# Patient Record
Sex: MALE | HISPANIC OR LATINO | Employment: FULL TIME | URBAN - METROPOLITAN AREA
[De-identification: names, ages, dates, MRNs, and addresses within clinical notes are randomized per-mention and may not be internally consistent; named-entity substitution may affect disease eponyms.]

---

## 2018-08-29 ENCOUNTER — HOSPITAL ENCOUNTER (EMERGENCY)
Facility: HOSPITAL | Age: 18
Discharge: HOME | End: 2018-08-29
Attending: EMERGENCY MEDICINE
Payer: COMMERCIAL

## 2018-08-29 VITALS
SYSTOLIC BLOOD PRESSURE: 142 MMHG | OXYGEN SATURATION: 99 % | HEIGHT: 68 IN | WEIGHT: 251.99 LBS | HEART RATE: 81 BPM | DIASTOLIC BLOOD PRESSURE: 76 MMHG | BODY MASS INDEX: 38.19 KG/M2 | RESPIRATION RATE: 18 BRPM | TEMPERATURE: 97.8 F

## 2018-08-29 DIAGNOSIS — K60.2 ANAL FISSURE: Primary | ICD-10-CM

## 2018-08-29 PROCEDURE — 99284 EMERGENCY DEPT VISIT MOD MDM: CPT

## 2018-08-29 PROCEDURE — 82272 OCCULT BLD FECES 1-3 TESTS: CPT

## 2018-08-29 RX ORDER — DICYCLOMINE HCL 20 MG
20 TABLET ORAL 2 TIMES DAILY PRN
Qty: 20 TABLET | Refills: 0 | Status: SHIPPED | OUTPATIENT
Start: 2018-08-29 | End: 2019-08-08

## 2018-08-29 NOTE — DISCHARGE INSTRUCTIONS
Anal Fissure   WHAT YOU NEED TO KNOW:   An anal fissure is a cut or tear in the tissue inside your anus  An anal fissure may be acute or chronic  An acute anal fissure is usually small and shallow and often heals without treatment  A chronic fissure may last longer than a month and will usually require treatment  A chronic anal fissure comes back after treatment  DISCHARGE INSTRUCTIONS:   Medicine:   · Topical medicine: Topical medicine may be put just inside your anus  This medicine may help your anal muscle relax and increase blood flow to your anus  This medicine may contain anesthesia to help decrease your pain  Your healthcare provider will teach you the right way to use topical medicine  · Stool softeners: Your healthcare provider may also give you medicine that makes your bowel movements softer  This helps prevent constipation  You will be less likely to strain and cause an anal fissure if you are not constipated  · Take your medicine as directed  Contact your healthcare provider if you think your medicine is not helping or if you have side effects  Tell him of her if you are allergic to any medicine  Keep a list of the medicines, vitamins, and herbs you take  Include the amounts, and when and why you take them  Bring the list or the pill bottles to follow-up visits  Carry your medicine list with you in case of an emergency  Follow up with your healthcare provider as directed: Your healthcare provider will need to make sure your anal fissure heals  Write down your questions so you remember to ask them during your visits  Bathing: You may need to soak in a warm tub or take a sitz bath  This may help decrease pain and swelling  You may need to do this more than once a day  Ask your healthcare provider how to use a sitz bath and how often you should bathe  Bowel care:  Do not ignore the urge to have a bowel movement  Do not strain  Clean the area gently after every bowel movement     Nutrition:  Eat foods that are high in fiber to help keep your bowel movements soft  High-fiber foods include fruits, vegetables, and whole grains  Drink more liquids to help soften your bowel movements  This will help prevent you from straining  Ask your healthcare provider how much liquid you should drink each day  Sexual intercourse:  Avoid anal intercourse for as long as directed by your healthcare provider  Anal intercourse may make it harder for your anal fissure to heal  It may also tear more tissue around your anus  Contact your healthcare provider if:   · You have a fever  · You still have pain after taking pain medicine  · You are unable to have a bowel movement  · You have spasms in your anus that do not stop  · You have questions or concerns about your condition or care  Return to the emergency department if:   · You have very bad pain in or around your anus  · You have bleeding from your anus that does not stop  © 2017 [address] [employer] Information is for End User's use only and may not be sold, redistributed or otherwise used for commercial purposes  All illustrations and images included in CareNotes® are the copyrighted property of A D A M , Inc  or Dexter Harvey  The above information is an  only  It is not intended as medical advice for individual conditions or treatments  Talk to your doctor, nurse or pharmacist before following any medical regimen to see if it is safe and effective for you

## 2018-08-29 NOTE — ED PROVIDER NOTES
History  Chief Complaint   Patient presents with    Abdominal Pain     denies nausea, vomiting   Rectal Bleeding     Patient is a 78-year-old male who complains of an episode of bright red blood per rectum 3 days ago and crampy lower abdominal pain x1 day  Patient states had 1 episode of bright red blood per rectum 3 days ago on has had subsequent bowel movements with no issues  States that he does have intermittent straining with his bowel movements but denies any direct history of constipation  Denies any nausea or vomiting or diarrhea  States his last bowel movement was at noon today with no issues  Did not eat anything today  States he has had normal appetite over last couple days  Did not get the medication for his pain  Nor has he called his regular docto     Patient states he edema no his regular doctor is  Denies any fevers sweats or chills  No history of abdominal surgery  None       History reviewed  No pertinent past medical history  History reviewed  No pertinent surgical history  History reviewed  No pertinent family history  I have reviewed and agree with the history as documented  Social History   Substance Use Topics    Smoking status: Never Smoker    Smokeless tobacco: Not on file    Alcohol use No        Review of Systems   Constitutional: Negative  HENT: Negative  Eyes: Negative  Respiratory: Negative  Cardiovascular: Negative  Gastrointestinal: Positive for abdominal pain and blood in stool  Negative for constipation, diarrhea, nausea, rectal pain and vomiting  Endocrine: Negative  Genitourinary: Negative  Musculoskeletal: Negative  Skin: Negative  Allergic/Immunologic: Negative  Neurological: Negative  Hematological: Negative  Psychiatric/Behavioral: Negative  All other systems reviewed and are negative  Physical Exam  Physical Exam   Constitutional: He appears well-developed and well-nourished     HENT:   Head: Normocephalic  Nose: Nose normal    Mouth/Throat: Oropharynx is clear and moist    Eyes: Conjunctivae and EOM are normal  Pupils are equal, round, and reactive to light  Neck: Normal range of motion  Neck supple  Cardiovascular: Normal rate, regular rhythm, normal heart sounds and intact distal pulses  Pulmonary/Chest: Effort normal and breath sounds normal    Abdominal: Soft  Bowel sounds are normal  He exhibits no distension and no mass  There is no tenderness  There is no rebound and no guarding  Genitourinary: Prostate normal  Rectal exam shows fissure  Rectal exam shows no external hemorrhoid, no internal hemorrhoid, no mass, no tenderness, anal tone normal and guaiac negative stool  Genitourinary Comments: Small pinhole lesion at 7 oclock  Musculoskeletal: Normal range of motion  Neurological: He is alert  Skin: Skin is warm and dry  Capillary refill takes less than 2 seconds  Nursing note and vitals reviewed  Vital Signs  ED Triage Vitals [08/29/18 1559]   Temperature Pulse Respirations Blood Pressure SpO2   97 8 °F (36 6 °C) 81 18 142/76 99 %      Temp Source Heart Rate Source Patient Position - Orthostatic VS BP Location FiO2 (%)   Temporal Monitor Sitting Left arm --      Pain Score       --           Vitals:    08/29/18 1559   BP: 142/76   Pulse: 81   Patient Position - Orthostatic VS: Sitting       Visual Acuity      ED Medications  Medications - No data to display    Diagnostic Studies  Results Reviewed     None                 No orders to display              Procedures  Procedures       Phone Contacts  ED Phone Contact    ED Course                               MDM  Number of Diagnoses or Management Options  Diagnosis management comments: Patient is a 70-year-old male who had an episode of bright red blood per eating 3 days ago  Exam shows a small anal fissure  Negative guaiac on exam   Normal color stool  No elle melena    Patient hemodynamically stable no signs anemia  Abdomen soft nontender nondistended  At this point will discharge some Bentyl for any crampy pain follow up his regular doctor return to the ER for any concerns  Amount and/or Complexity of Data Reviewed  Clinical lab tests: ordered and reviewed      CritCare Time    Disposition  Final diagnoses:   Anal fissure     Time reflects when diagnosis was documented in both MDM as applicable and the Disposition within this note     Time User Action Codes Description Comment    8/29/2018  4:13 PM Winston Angeles Add [K60 2] Anal fissure       ED Disposition     ED Disposition Condition Comment    Discharge  Adryan Crystal discharge to home/self care  Condition at discharge: Stable        Follow-up Information     Follow up With Specialties Details Why Contact Info    Fariha MORENO  [address]  [phone]            Discharge Medication List as of 8/29/2018  4:14 PM      START taking these medications    Details   dicyclomine (BENTYL) 20 mg tablet Take 1 tablet (20 mg total) by mouth 2 (two) times a day as needed (abdominal pain ), Starting Wed 8/29/2018, Print           No discharge procedures on file      ED Provider  Electronically Signed by           King Addison MD  08/30/18 7102

## 2019-08-08 ENCOUNTER — HOSPITAL ENCOUNTER (EMERGENCY)
Facility: HOSPITAL | Age: 19
Discharge: HOME | End: 2019-08-08
Attending: EMERGENCY MEDICINE | Admitting: EMERGENCY MEDICINE

## 2019-08-08 VITALS
TEMPERATURE: 97.2 F | DIASTOLIC BLOOD PRESSURE: 72 MMHG | WEIGHT: 254.19 LBS | SYSTOLIC BLOOD PRESSURE: 139 MMHG | OXYGEN SATURATION: 97 % | RESPIRATION RATE: 17 BRPM | BODY MASS INDEX: 38.65 KG/M2 | HEART RATE: 96 BPM

## 2019-08-08 DIAGNOSIS — R19.7 DIARRHEA: Primary | ICD-10-CM

## 2019-08-08 LAB
ALBUMIN SERPL BCP-MCNC: 4.4 G/DL (ref 3–5.2)
ALP SERPL-CCNC: 96 U/L (ref 43–122)
ALT SERPL W P-5'-P-CCNC: 7 U/L (ref 9–52)
ANION GAP SERPL CALCULATED.3IONS-SCNC: 11 MMOL/L (ref 5–14)
AST SERPL W P-5'-P-CCNC: 21 U/L (ref 17–59)
BACTERIA UR QL AUTO: ABNORMAL /HPF
BASOPHILS # BLD AUTO: 0 THOUSANDS/ΜL (ref 0–0.1)
BASOPHILS NFR BLD AUTO: 0 % (ref 0–1)
BILIRUB SERPL-MCNC: 0.6 MG/DL
BILIRUB UR QL STRIP: ABNORMAL
BUN SERPL-MCNC: 9 MG/DL (ref 5–25)
CALCIUM SERPL-MCNC: 9.4 MG/DL (ref 8.4–10.2)
CHLORIDE SERPL-SCNC: 99 MMOL/L (ref 97–108)
CLARITY UR: ABNORMAL
CO2 SERPL-SCNC: 29 MMOL/L (ref 22–30)
COLOR UR: ABNORMAL
CREAT SERPL-MCNC: 0.85 MG/DL (ref 0.7–1.5)
EOSINOPHIL # BLD AUTO: 0 THOUSAND/ΜL (ref 0–0.4)
EOSINOPHIL NFR BLD AUTO: 0 % (ref 0–6)
ERYTHROCYTE [DISTWIDTH] IN BLOOD BY AUTOMATED COUNT: 13.7 %
GFR SERPL CREATININE-BSD FRML MDRD: 125 ML/MIN/1.73SQ M
GLUCOSE SERPL-MCNC: 104 MG/DL (ref 70–99)
GLUCOSE UR STRIP-MCNC: NEGATIVE MG/DL
HCT VFR BLD AUTO: 47.2 % (ref 41–53)
HGB BLD-MCNC: 15.8 G/DL (ref 13.5–17.5)
HGB UR QL STRIP.AUTO: 10
KETONES UR STRIP-MCNC: ABNORMAL MG/DL
LEUKOCYTE ESTERASE UR QL STRIP: NEGATIVE
LIPASE SERPL-CCNC: 106 U/L (ref 23–300)
LYMPHOCYTES # BLD AUTO: 1.1 THOUSANDS/ΜL (ref 0.5–4)
LYMPHOCYTES NFR BLD AUTO: 9 % (ref 25–45)
MCH RBC QN AUTO: 28.2 PG (ref 26–34)
MCHC RBC AUTO-ENTMCNC: 33.5 G/DL (ref 31–36)
MCV RBC AUTO: 84 FL (ref 80–100)
MONOCYTES # BLD AUTO: 0.6 THOUSAND/ΜL (ref 0.2–0.9)
MONOCYTES NFR BLD AUTO: 6 % (ref 1–10)
MUCOUS THREADS UR QL AUTO: ABNORMAL
NEUTROPHILS # BLD AUTO: 9.8 THOUSANDS/ΜL (ref 1.8–7.8)
NEUTS SEG NFR BLD AUTO: 85 % (ref 45–65)
NITRITE UR QL STRIP: NEGATIVE
NON-SQ EPI CELLS URNS QL MICRO: ABNORMAL /HPF
PH UR STRIP.AUTO: 6 [PH]
PLATELET # BLD AUTO: 225 THOUSANDS/UL (ref 150–450)
PMV BLD AUTO: 7.1 FL (ref 8.9–12.7)
POTASSIUM SERPL-SCNC: 3.2 MMOL/L (ref 3.6–5)
PROT SERPL-MCNC: 8 G/DL (ref 5.9–8.4)
PROT UR STRIP-MCNC: ABNORMAL MG/DL
RBC # BLD AUTO: 5.61 MILLION/UL (ref 4.5–5.9)
RBC #/AREA URNS AUTO: ABNORMAL /HPF
SODIUM SERPL-SCNC: 139 MMOL/L (ref 137–147)
SP GR UR STRIP.AUTO: 1.02 (ref 1–1.04)
UROBILINOGEN UA: NEGATIVE MG/DL
WBC # BLD AUTO: 11.5 THOUSAND/UL (ref 4.5–11)
WBC #/AREA URNS AUTO: ABNORMAL /HPF

## 2019-08-08 PROCEDURE — 81001 URINALYSIS AUTO W/SCOPE: CPT | Performed by: EMERGENCY MEDICINE

## 2019-08-08 PROCEDURE — 99283 EMERGENCY DEPT VISIT LOW MDM: CPT | Performed by: EMERGENCY MEDICINE

## 2019-08-08 PROCEDURE — 83690 ASSAY OF LIPASE: CPT | Performed by: EMERGENCY MEDICINE

## 2019-08-08 PROCEDURE — 80053 COMPREHEN METABOLIC PANEL: CPT | Performed by: EMERGENCY MEDICINE

## 2019-08-08 PROCEDURE — 96360 HYDRATION IV INFUSION INIT: CPT

## 2019-08-08 PROCEDURE — NC001 PR NO CHARGE: Performed by: EMERGENCY MEDICINE

## 2019-08-08 PROCEDURE — 85025 COMPLETE CBC W/AUTO DIFF WBC: CPT | Performed by: EMERGENCY MEDICINE

## 2019-08-08 PROCEDURE — 99284 EMERGENCY DEPT VISIT MOD MDM: CPT

## 2019-08-08 PROCEDURE — 36415 COLL VENOUS BLD VENIPUNCTURE: CPT | Performed by: EMERGENCY MEDICINE

## 2019-08-08 RX ORDER — POTASSIUM CHLORIDE 750 MG/1
40 TABLET, EXTENDED RELEASE ORAL ONCE
Status: COMPLETED | OUTPATIENT
Start: 2019-08-08 | End: 2019-08-08

## 2019-08-08 RX ORDER — DICYCLOMINE HCL 20 MG
20 TABLET ORAL 2 TIMES DAILY
Qty: 6 TABLET | Refills: 0 | Status: SHIPPED | OUTPATIENT
Start: 2019-08-08 | End: 2019-08-11

## 2019-08-08 RX ORDER — DICYCLOMINE HCL 20 MG
20 TABLET ORAL ONCE
Status: COMPLETED | OUTPATIENT
Start: 2019-08-08 | End: 2019-08-08

## 2019-08-08 RX ADMIN — POTASSIUM CHLORIDE 40 MEQ: 10 TABLET, EXTENDED RELEASE ORAL at 12:51

## 2019-08-08 RX ADMIN — DICYCLOMINE HYDROCHLORIDE 20 MG: 20 TABLET ORAL at 12:51

## 2019-08-08 RX ADMIN — SODIUM CHLORIDE 1000 ML: 0.9 INJECTION, SOLUTION INTRAVENOUS at 11:52

## 2019-08-08 NOTE — ED PROVIDER NOTES
History  Chief Complaint   Patient presents with    Abdominal Pain     abd pain since yesterday  also having diarrhea off and on for about 1 month     22year old male with no significant PMH has diarrhea and abdominal pain x 2 days  Returned from [place] 3 days ago after 5 months there  Does not recall eating anything unusual  No recent alcohol use or drug use  Reports drinking two large monsters 2 days ago  Had 6-7 loose watery stools over past 48 hours  No blood in stool  Unsure if he had a fever but reports being extra cold yesterday, and headache x 1 day  Currently patient denies any chest pain, palpitations, light headedness, vision change, N/V, urinary symptoms  Patient denies any falls or bodily injuries or head injury  Recalls about three months ago in  he had a couple episodes of diarrhea and notes he saw something like a little worm or bug in his stool  Did not see any movement  The diarrhea subsided and he never noted anything similar since then  None       History reviewed  No pertinent past medical history  Past Surgical History:   Procedure Laterality Date    EYE SURGERY         History reviewed  No pertinent family history  I have reviewed and agree with the history as documented  Social History     Tobacco Use    Smoking status: Never Smoker    Smokeless tobacco: Never Used   Substance Use Topics    Alcohol use: Yes     Comment: rare    Drug use: No        Review of Systems   Constitutional: Positive for chills  Negative for activity change, appetite change, diaphoresis and fatigue  HENT: Negative  Negative for congestion, rhinorrhea, sinus pressure, sinus pain, sore throat and trouble swallowing  Eyes: Negative  Negative for pain, discharge, itching and visual disturbance  Respiratory: Negative  Negative for apnea, cough, chest tightness and shortness of breath  Cardiovascular: Negative    Negative for chest pain, palpitations and leg swelling  Gastrointestinal: Positive for abdominal pain and diarrhea  Negative for abdominal distention, nausea and vomiting  Endocrine: Negative  Genitourinary: Negative  Negative for difficulty urinating and dysuria  Musculoskeletal: Negative  Negative for arthralgias, myalgias, neck pain and neck stiffness  Skin: Negative  Negative for rash  Allergic/Immunologic: Negative  Neurological: Positive for headaches  Negative for dizziness, weakness, light-headedness and numbness  Hematological: Negative  Psychiatric/Behavioral: Negative  Negative for agitation and behavioral problems  Physical Exam  ED Triage Vitals [08/08/19 1115]   Temperature Pulse Respirations Blood Pressure SpO2   (!) 97 2 °F (36 2 °C) 103 20 157/91 99 %      Temp Source Heart Rate Source Patient Position - Orthostatic VS BP Location FiO2 (%)   Tympanic Monitor Sitting Left arm --      Pain Score       7             Orthostatic Vital Signs  Vitals:    08/08/19 1115   BP: 157/91   Pulse: 103   Patient Position - Orthostatic VS: Sitting       Physical Exam   Constitutional: He is oriented to person, place, and time  He appears well-developed and well-nourished  HENT:   Head: Normocephalic and atraumatic  Mouth/Throat: No oropharyngeal exudate  Eyes: Pupils are equal, round, and reactive to light  EOM are normal    Neck: Normal range of motion  Neck supple  Cardiovascular: Normal rate, regular rhythm, normal heart sounds and intact distal pulses  No murmur heard  Pulmonary/Chest: Effort normal and breath sounds normal  No respiratory distress  He exhibits no tenderness  Abdominal: Soft  Bowel sounds are normal  He exhibits no distension  There is tenderness in the epigastric area and suprapubic area  There is no rebound, no guarding, no tenderness at McBurney's point and negative Banegas's sign  Musculoskeletal: Normal range of motion  Neurological: He is alert and oriented to person, place, and time  Skin: Skin is warm and dry  Capillary refill takes less than 2 seconds  He is not diaphoretic  Psychiatric: He has a normal mood and affect  Nursing note and vitals reviewed  ED Medications  Medications   sodium chloride 0 9 % bolus 1,000 mL (1,000 mL Intravenous New Bag 8/8/19 1152)       Diagnostic Studies  Results Reviewed     Procedure Component Value Units Date/Time    Comprehensive metabolic panel [[id number]] Collected:  08/08/19 1150    Lab Status: In process Specimen:  Blood from Arm, Left Updated:  08/08/19 1207    Lipase [[id number]] Collected:  08/08/19 1150    Lab Status: In process Specimen:  Blood from Arm, Left Updated:  08/08/19 1207    CBC and differential [[id number]] Collected:  08/08/19 1150    Lab Status: In process Specimen:  Blood from Arm, Left Updated:  08/08/19 1207    UA w Reflex to Microscopic [[id number]] Collected:  08/08/19 1150    Lab Status: In process Specimen:  Urine, Clean Catch Updated:  08/08/19 1206    Ova and parasite examination [[id number]]     Lab Status:  No result Specimen:  Stool from Rectum     Clostridium difficile toxin by PCR [[id number]]     Lab Status:  No result Specimen:  Stool                  No orders to display         Procedures  Procedures        ED Course                               MDM  Number of Diagnoses or Management Options  Diarrhea:   Diagnosis management comments: 22year old male with no significant PMH has diarrhea and abdominal pain x 2 days  Possible gastroenteritis, viral or ova/parasite given his recent travel  Physical exam shows mild pain on tenderness in epigastric and suprapubic regions  UA positive ketones, likely dehydrated, IV fluids commenced  Low potassium and was give KCl PO, encouraged to eat more bananas  Was unable to provide a stool sample today and provided equipment to collect it at home and bring [facility] to [facility] lab  Reports abdominal pain improved mildly with Bentyl   Patient hemodynamically stable and instructed to follow up with PCP within one week to establish care  If symptoms worsen he will return to ED  Disposition  Final diagnoses:   None     ED Disposition     None      Follow-up Information    None         Patient's Medications   Discharge Prescriptions    No medications on file     No discharge procedures on file  ED Provider  Attending physically available and evaluated Clephani Juve  I managed the patient along with the ED Attending      Electronically Signed by         Michael Saenz MD  08/08/19 6396

## 2019-08-08 NOTE — ED PROVIDER NOTES
History  Chief Complaint   Patient presents with    Abdominal Pain     abd pain since yesterday  also having diarrhea off and on for about 1 month       History provided by:  Patient  Abdominal Pain   Pain location:  Suprapubic and epigastric  Pain quality: aching and cramping    Pain radiates to:  Does not radiate  Pain severity:  Moderate  Onset quality:  Gradual  Timing:  Constant  Progression:  Worsening  Chronicity:  New  Relieved by:  Nothing  Worsened by:  Nothing  Ineffective treatments:  None tried  Associated symptoms: diarrhea    Associated symptoms: no chest pain, no chills, no cough, no dysuria, no fever, no nausea, no shortness of breath and no sore throat    Diarrhea:     Number of occurrences:  6-7    Severity:  Moderate    Timing:  Constant    Progression:  Worsening      None       History reviewed  No pertinent past medical history  Past Surgical History:   Procedure Laterality Date    EYE SURGERY         History reviewed  No pertinent family history  I have reviewed and agree with the history as documented  Social History     Tobacco Use    Smoking status: Never Smoker    Smokeless tobacco: Never Used   Substance Use Topics    Alcohol use: Yes     Comment: rare    Drug use: No        Review of Systems   Constitutional: Negative for chills and fever  HENT: Negative for rhinorrhea, sore throat and trouble swallowing  Eyes: Negative for pain  Respiratory: Negative for cough, shortness of breath, wheezing and stridor  Cardiovascular: Negative for chest pain and leg swelling  Gastrointestinal: Positive for abdominal pain and diarrhea  Negative for nausea  Endocrine: Negative for polyuria  Genitourinary: Negative for dysuria, flank pain and urgency  Musculoskeletal: Negative for joint swelling, myalgias and neck stiffness  Skin: Negative for rash  Allergic/Immunologic: Negative for immunocompromised state     Neurological: Negative for dizziness, syncope, weakness, numbness and headaches  Psychiatric/Behavioral: Negative for confusion and suicidal ideas  All other systems reviewed and are negative  Physical Exam  Physical Exam   Constitutional: He is oriented to person, place, and time  He appears well-developed and well-nourished  HENT:   Head: Normocephalic and atraumatic  Eyes: Pupils are equal, round, and reactive to light  EOM are normal    Neck: Normal range of motion  Neck supple  Cardiovascular: Normal rate and regular rhythm  Exam reveals no friction rub  No murmur heard  Pulmonary/Chest: Breath sounds normal  No respiratory distress  He has no wheezes  He has no rales  Abdominal: Soft  Bowel sounds are normal  He exhibits no distension  There is tenderness in the epigastric area and suprapubic area  Musculoskeletal: Normal range of motion  He exhibits no edema or tenderness  Neurological: He is alert and oriented to person, place, and time  Skin: Skin is warm  No rash noted  Psychiatric: He has a normal mood and affect  Nursing note and vitals reviewed        Vital Signs  ED Triage Vitals [08/08/19 1115]   Temperature Pulse Respirations Blood Pressure SpO2   (!) 97 2 °F (36 2 °C) 103 20 157/91 99 %      Temp Source Heart Rate Source Patient Position - Orthostatic VS BP Location FiO2 (%)   Tympanic Monitor Sitting Left arm --      Pain Score       7           Vitals:    08/08/19 1115 08/08/19 1253   BP: 157/91 139/72   Pulse: 103 96   Patient Position - Orthostatic VS: Sitting Lying         Visual Acuity      ED Medications  Medications   sodium chloride 0 9 % bolus 1,000 mL (0 mL Intravenous Stopped 8/8/19 1253)   dicyclomine (BENTYL) tablet 20 mg (20 mg Oral Given 8/8/19 1251)   potassium chloride (K-DUR,KLOR-CON) CR tablet 40 mEq (40 mEq Oral Given 8/8/19 1251)       Diagnostic Studies  Results Reviewed     Procedure Component Value Units Date/Time    Urine Microscopic [[id number]]  (Abnormal) Collected:  08/08/19 1150    Lab Status: Final result Specimen:  Urine, Clean Catch Updated:  08/08/19 1220     RBC, UA 0-1 /hpf      WBC, UA 1-2 /hpf      Epithelial Cells Occasional /hpf      Bacteria, UA Occasional /hpf      MUCUS THREADS Moderate    Comprehensive metabolic panel [[id number]]  (Abnormal) Collected:  08/08/19 1150    Lab Status:  Final result Specimen:  Blood from Arm, Left Updated:  08/08/19 1217     Sodium 139 mmol/L      Potassium 3 2 mmol/L      Chloride 99 mmol/L      CO2 29 mmol/L      ANION GAP 11 mmol/L      BUN 9 mg/dL      Creatinine 0 85 mg/dL      Glucose 104 mg/dL      Calcium 9 4 mg/dL      AST 21 U/L      ALT 7 U/L      Alkaline Phosphatase 96 U/L      Total Protein 8 0 g/dL      Albumin 4 4 g/dL      Total Bilirubin 0 60 mg/dL      eGFR 125 ml/min/1 73sq m     Narrative:       [place] guidelines for Chronic Kidney Disease (CKD):     Stage 1 with normal or high GFR (GFR > 90 mL/min/1 73 square meters)    Stage 2 Mild CKD (GFR = 60-89 mL/min/1 73 square meters)    Stage 3A Moderate CKD (GFR = 45-59 mL/min/1 73 square meters)    Stage 3B Moderate CKD (GFR = 30-44 mL/min/1 73 square meters)    Stage 4 Severe CKD (GFR = 15-29 mL/min/1 73 square meters)    Stage 5 End Stage CKD (GFR <15 mL/min/1 73 square meters)  Note: GFR calculation is accurate only with a steady state creatinine    Lipase [[id number]]  (Normal) Collected:  08/08/19 1150    Lab Status:  Final result Specimen:  Blood from Arm, Left Updated:  08/08/19 1217     Lipase 106 u/L     UA w Reflex to Microscopic [[id number]]  (Abnormal) Collected:  08/08/19 1150    Lab Status:  Final result Specimen:  Urine, Clean Catch Updated:  08/08/19 1212     Color, UA Brown     Clarity, UA Slightly Cloudy     Specific Counce, UA 1 020     pH, UA 6 0     Leukocytes, UA Negative     Nitrite, UA Negative     Protein, UA 30 (1+) mg/dl      Glucose, UA Negative mg/dl      Ketones, UA >=150 (3+) mg/dl      Bilirubin, UA 1 mg/dL     Blood, UA 10 0 UROBILINOGEN UA Negative mg/dL     CBC and differential [[id number]]  (Abnormal) Collected:  08/08/19 1150    Lab Status:  Final result Specimen:  Blood from Arm, Left Updated:  08/08/19 1211     WBC 11 50 Thousand/uL      RBC 5 61 Million/uL      Hemoglobin 15 8 g/dL      Hematocrit 47 2 %      MCV 84 fL      MCH 28 2 pg      MCHC 33 5 g/dL      RDW 13 7 %      MPV 7 1 fL      Platelets 815 Thousands/uL      Neutrophils Relative 85 %      Lymphocytes Relative 9 %      Monocytes Relative 6 %      Eosinophils Relative 0 %      Basophils Relative 0 %      Neutrophils Absolute 9 80 Thousands/µL      Lymphocytes Absolute 1 10 Thousands/µL      Monocytes Absolute 0 60 Thousand/µL      Eosinophils Absolute 0 00 Thousand/µL      Basophils Absolute 0 00 Thousands/µL     Ova and parasite examination [[id number]]     Lab Status:  No result Specimen:  Stool from Rectum     Clostridium difficile toxin by PCR [[id number]]     Lab Status:  No result Specimen:  Stool                  No orders to display              Procedures  Procedures       ED Course                               MDM  Number of Diagnoses or Management Options     Amount and/or Complexity of Data Reviewed  Clinical lab tests: ordered and reviewed        Disposition  Final diagnoses:   Diarrhea     Time reflects when diagnosis was documented in both MDM as applicable and the Disposition within this note     Time User Action Codes Description Comment    8/8/2019 12:32 PM Sia Newsome Add [R19 7] Diarrhea       ED Disposition     ED Disposition Condition Date/Time Comment    Discharge Stable Thu Aug 8, 2019  1:02 PM Adryan Rojas discharge to home/self care              Follow-up Information     Follow up With Specialties Details Why Contact Info Additional [address] Medicine Schedule an appointment as soon as possible for a visit in 1 week with Dr Familia Saleh or any available provider [address], [address] [place] [place], [address], [address] [place], [place], [address]          Discharge Medication List as of 8/8/2019  1:04 PM      START taking these medications    Details   dicyclomine (BENTYL) 20 mg tablet Take 1 tablet (20 mg total) by mouth 2 (two) times a day for 3 days, Starting Thu 8/8/2019, Until Sun 8/11/2019, Print           Outpatient Discharge Orders   Ova and parasite examination   Standing Status: Future Standing Exp  Date: 08/08/20     Clostridium difficile toxin by PCR   Standing Status: Future Standing Exp   Date: 08/08/20       ED Provider  Electronically Signed by           Sho Desouza DO  08/10/19 8310

## 2019-08-08 NOTE — DISCHARGE INSTRUCTIONS
Drink plenty of fluids   Add bananas into your diet for your potassium  Take Bentyl twice a day for three days for your abdominal pain   Make appointment with Dr Bao Alves, or any other provider at office listed above to establish care and follow up stool sample  Please provide a stool sample to the lab located to left when you walk into the main entrance of the hospital

## 2019-10-05 ENCOUNTER — OFFICE VISIT (OUTPATIENT)
Dept: URGENT CARE | Facility: MEDICAL CENTER | Age: 19
End: 2019-10-05
Payer: COMMERCIAL

## 2019-10-05 VITALS
DIASTOLIC BLOOD PRESSURE: 70 MMHG | SYSTOLIC BLOOD PRESSURE: 124 MMHG | TEMPERATURE: 97.8 F | RESPIRATION RATE: 16 BRPM | OXYGEN SATURATION: 99 % | HEART RATE: 78 BPM

## 2019-10-05 DIAGNOSIS — Z02.4 DRIVER'S PERMIT PE (PHYSICAL EXAMINATION): Primary | ICD-10-CM

## 2019-10-05 NOTE — PROGRESS NOTES
[address]        NAME: Leopold Melody is a 22 y o  male  : 2000    MRN: [id number]  DATE: 2019  TIME: 1:54 PM    Assessment and Plan   's permit PE (physical examination) [Z02 4]  1  's permit PE (physical examination)           Patient Instructions   Discussed need for glasses in the future  Chief Complaint     Chief Complaint   Patient presents with    Annual Exam     here for drivers permit physical         History of Present Illness       Patient here for 's exam   Negative history  See attached  Review of Systems   Review of Systems   All other systems reviewed and are negative  Current Medications       Current Outpatient Medications:     dicyclomine (BENTYL) 20 mg tablet, Take 1 tablet (20 mg total) by mouth 2 (two) times a day for 3 days, Disp: 6 tablet, Rfl: 0    Current Allergies     Allergies as of 10/05/2019 - Reviewed 10/05/2019   Allergen Reaction Noted    Milk-related compounds  2019            The following portions of the patient's history were reviewed and updated as appropriate: allergies, current medications, past family history, past medical history, past social history, past surgical history and problem list      History reviewed  No pertinent past medical history  Past Surgical History:   Procedure Laterality Date    EYE SURGERY         No family history on file  Medications have been verified  Objective   /70 (BP Location: Right arm, Patient Position: Sitting, Cuff Size: Standard)   Pulse 78   Temp 97 8 °F (36 6 °C) (Tympanic)   Resp 16   SpO2 99%        Physical Exam     Physical Exam   Constitutional: He is oriented to person, place, and time  He appears well-developed and well-nourished  HENT:   Head: Normocephalic     Right Ear: External ear normal    Left Ear: External ear normal    Nose: Nose normal    Mouth/Throat: Oropharynx is clear and moist    Eyes: Pupils are equal, round, and reactive to light  Conjunctivae and EOM are normal    Neck: Normal range of motion  Cardiovascular: Normal rate, regular rhythm and normal heart sounds  Pulmonary/Chest: Effort normal and breath sounds normal    Abdominal: Soft  Bowel sounds are normal    Musculoskeletal: Normal range of motion  Neurological: He is alert and oriented to person, place, and time  Nursing note and vitals reviewed

## 2021-01-05 ENCOUNTER — HOSPITAL ENCOUNTER (EMERGENCY)
Facility: HOSPITAL | Age: 21
Discharge: HOME | End: 2021-01-05
Attending: EMERGENCY MEDICINE | Admitting: EMERGENCY MEDICINE

## 2021-01-05 VITALS
HEIGHT: 68 IN | SYSTOLIC BLOOD PRESSURE: 146 MMHG | RESPIRATION RATE: 18 BRPM | TEMPERATURE: 99 F | OXYGEN SATURATION: 100 % | DIASTOLIC BLOOD PRESSURE: 70 MMHG | HEART RATE: 102 BPM | BODY MASS INDEX: 47.74 KG/M2 | WEIGHT: 315 LBS

## 2021-01-05 DIAGNOSIS — T78.1XXA ALLERGIC REACTION TO FOOD, INITIAL ENCOUNTER: Primary | ICD-10-CM

## 2021-01-05 PROCEDURE — 99282 EMERGENCY DEPT VISIT SF MDM: CPT | Performed by: PHYSICIAN ASSISTANT

## 2021-01-05 PROCEDURE — 99283 EMERGENCY DEPT VISIT LOW MDM: CPT

## 2021-01-05 RX ORDER — DIPHENHYDRAMINE HCL 25 MG
25 TABLET ORAL ONCE
Status: COMPLETED | OUTPATIENT
Start: 2021-01-05 | End: 2021-01-05

## 2021-01-05 RX ORDER — DIPHENHYDRAMINE HCL 25 MG
25 TABLET ORAL EVERY 6 HOURS
Qty: 20 TABLET | Refills: 0 | Status: SHIPPED | OUTPATIENT
Start: 2021-01-05

## 2021-01-05 RX ADMIN — DIPHENHYDRAMINE HCL 25 MG: 25 TABLET ORAL at 17:35

## 2021-01-05 NOTE — ED PROVIDER NOTES
History  Chief Complaint   Patient presents with    Allergic Reaction     pt ate lobster approx 30 minutes ago, states shortly after began having feelings of his lips/tongue swelling up  denies any airway involvement, no difficulty breathing/swallowing/speaking  Patient is a 68-year-old male reports no past medical history, patient reports he ate lobster approximately half an hour prior to arrival emergency department reports father has a significant allergy to lobster and cannot use it patient reports his lips felt tingly after eating lobster  Patient denies any chest shortness of breath, abdominal pain, nausea, vomiting, rashes, tongue swelling, difficulty managing oral secretions  Patient reports he took Tylenol and felt a little bit better  Patient reports he does still feels though his lips feel weird  Prior to Admission Medications   Prescriptions Last Dose Informant Patient Reported? Taking?   dicyclomine (BENTYL) 20 mg tablet   No No   Sig: Take 1 tablet (20 mg total) by mouth 2 (two) times a day for 3 days      Facility-Administered Medications: None       History reviewed  No pertinent past medical history  Past Surgical History:   Procedure Laterality Date    EYE SURGERY         History reviewed  No pertinent family history  I have reviewed and agree with the history as documented  E-Cigarette/Vaping     E-Cigarette/Vaping Substances     Social History     Tobacco Use    Smoking status: Never Smoker    Smokeless tobacco: Never Used   Substance Use Topics    Alcohol use: Yes     Frequency: 2-4 times a month     Comment: rare    Drug use: No       Review of Systems   Constitutional: Negative for chills, fatigue and fever  HENT: Positive for facial swelling ( lip 'tingling' )  Negative for congestion, ear pain, rhinorrhea and sore throat  Eyes: Negative for redness  Respiratory: Negative for chest tightness and shortness of breath      Cardiovascular: Negative for chest pain and palpitations  Gastrointestinal: Negative for abdominal pain, nausea and vomiting  Genitourinary: Negative for dysuria and hematuria  Musculoskeletal: Negative  Skin: Negative for rash  Neurological: Negative for dizziness, syncope, light-headedness and numbness  Physical Exam  Physical Exam  Vitals signs and nursing note reviewed  Constitutional:       Appearance: Normal appearance  He is well-developed  Comments: Well-appearing male in no acute distress   HENT:      Head: Normocephalic and atraumatic  Mouth/Throat:      Comments: No lesions noted to the lips no swelling noted on upper or lower  Patient not slurring words, tongue does not appear to be enlarged  Uvula is visible  Patient managing oral secretions without difficulty  Eyes:      General: No scleral icterus  Pupils: Pupils are equal, round, and reactive to light  Neck:      Musculoskeletal: Normal range of motion  Cardiovascular:      Rate and Rhythm: Normal rate and regular rhythm  Pulses: Normal pulses  Pulmonary:      Effort: Pulmonary effort is normal  No respiratory distress  Breath sounds: No stridor  Abdominal:      General: There is no distension  Palpations: There is no mass  Skin:     General: Skin is warm and dry  Capillary Refill: Capillary refill takes less than 2 seconds  Coloration: Skin is not jaundiced  Neurological:      Mental Status: He is alert and oriented to person, place, and time        Gait: Gait normal    Psychiatric:         Mood and Affect: Mood normal          Vital Signs  ED Triage Vitals   Temperature Pulse Respirations Blood Pressure SpO2   01/05/21 1709 01/05/21 1709 01/05/21 1709 01/05/21 1712 01/05/21 1709   99 °F (37 2 °C) (!) 130 20 146/70 99 %      Temp Source Heart Rate Source Patient Position - Orthostatic VS BP Location FiO2 (%)   01/05/21 1709 01/05/21 1709 01/05/21 1709 01/05/21 1709 --   Tympanic Monitor Sitting Right arm Pain Score       --                  Vitals:    01/05/21 1709 01/05/21 1712   BP:  146/70   Pulse: (!) 130    Patient Position - Orthostatic VS: Sitting          Visual Acuity      ED Medications  Medications   diphenhydrAMINE (BENADRYL) tablet 25 mg (25 mg Oral Given 1/5/21 1735)       Diagnostic Studies  Results Reviewed     None                 No orders to display              Procedures  Procedures         ED Course                                           MDM  Number of Diagnoses or Management Options  Allergic reaction to food, initial encounter:   Diagnosis management comments: 20-year-old male reports he ingested lobster half an hour prior to arrival in the ER reports some lip tingling patient reports his father is allergic to lobster  Patient denies any other associated symptoms has clear lung sounds on auscultation no evidence of anaphylaxis will provide Benadryl and a prescription to take at home  All imaging and/or lab testing discussed with patient, strict return to ED precautions discussed  Patient and/or family members verbalizes understanding and agrees with plan  Patient is stable for discharge     Portions of the record may have been created with voice recognition software  Occasional wrong word or "sound a like" substitutions may have occurred due to the inherent limitations of voice recognition software  Read the chart carefully and recognize, using context, where substitutions have occurred  Disposition  Final diagnoses: Allergic reaction to food, initial encounter     Time reflects when diagnosis was documented in both MDM as applicable and the Disposition within this note     Time User Action Codes Description Comment    1/5/2021  5:19 PM Joesph Liu  1XXA] Allergic reaction to food, initial encounter       ED Disposition     ED Disposition Condition Date/Time Comment    Discharge Good Tue Jan 5, 2021  5:18 PM Adryan Rojas discharge to home/self care  Follow-up Information     Follow up With Specialties Details Why Kathryn Sousa MD Family Medicine Schedule an appointment as soon as possible for a visit in 2 days As needed [address] [facility] [address]            Patient's Medications   Discharge Prescriptions    DIPHENHYDRAMINE (BENADRYL) 25 MG TABLET    Take 1 tablet (25 mg total) by mouth every 6 (six) hours       Start Date: 1/5/2021  End Date: --       Order Dose: 25 mg       Quantity: 20 tablet    Refills: 0     No discharge procedures on file      PDMP Review     None          ED Provider  Electronically Signed by           Jae Delgado PA-C  01/05/21 0155

## 2022-09-22 ENCOUNTER — HOSPITAL ENCOUNTER (EMERGENCY)
Facility: HOSPITAL | Age: 22
Discharge: HOME | End: 2022-09-22
Attending: EMERGENCY MEDICINE

## 2022-09-22 VITALS
TEMPERATURE: 98.1 F | RESPIRATION RATE: 18 BRPM | HEART RATE: 98 BPM | DIASTOLIC BLOOD PRESSURE: 93 MMHG | SYSTOLIC BLOOD PRESSURE: 137 MMHG | OXYGEN SATURATION: 99 %

## 2022-09-22 DIAGNOSIS — T78.40XA ALLERGIC REACTION, INITIAL ENCOUNTER: Primary | ICD-10-CM

## 2022-09-22 DIAGNOSIS — T78.1XXA ALLERGIC REACTION TO FOOD, INITIAL ENCOUNTER: ICD-10-CM

## 2022-09-22 PROCEDURE — 99283 EMERGENCY DEPT VISIT LOW MDM: CPT

## 2022-09-22 PROCEDURE — 99284 EMERGENCY DEPT VISIT MOD MDM: CPT | Performed by: EMERGENCY MEDICINE

## 2022-09-22 RX ORDER — PREDNISONE 20 MG/1
60 TABLET ORAL ONCE
Status: COMPLETED | OUTPATIENT
Start: 2022-09-22 | End: 2022-09-22

## 2022-09-22 RX ORDER — DIPHENHYDRAMINE HCL 25 MG
25 TABLET ORAL EVERY 6 HOURS PRN
Qty: 20 TABLET | Refills: 0 | Status: SHIPPED | OUTPATIENT
Start: 2022-09-22

## 2022-09-22 RX ORDER — FAMOTIDINE 20 MG/1
20 TABLET, FILM COATED ORAL ONCE
Status: COMPLETED | OUTPATIENT
Start: 2022-09-22 | End: 2022-09-22

## 2022-09-22 RX ORDER — PREDNISONE 20 MG/1
TABLET ORAL
Qty: 12 TABLET | Refills: 0 | Status: SHIPPED | OUTPATIENT
Start: 2022-09-22

## 2022-09-22 RX ORDER — DIPHENHYDRAMINE HCL 25 MG
25 TABLET ORAL ONCE
Status: COMPLETED | OUTPATIENT
Start: 2022-09-22 | End: 2022-09-22

## 2022-09-22 RX ADMIN — DIPHENHYDRAMINE HYDROCHLORIDE 25 MG: 25 TABLET ORAL at 15:05

## 2022-09-22 RX ADMIN — FAMOTIDINE 20 MG: 20 TABLET ORAL at 15:05

## 2022-09-22 RX ADMIN — PREDNISONE 60 MG: 20 TABLET ORAL at 15:05

## 2022-09-22 NOTE — ED PROVIDER NOTES
History  Chief Complaint   Patient presents with    Allergic Reaction     Pt ate shrimp approx 90min ago and reports shortness of breath now  States this has happened before  Also reports mild chest pain     47-year-old male comes in for evaluation after allergic reaction  Patient has a known history of shrimp and states approximately 90 minutes ago he age from  Patient states he did not think it would bother him because it was mixed with chicken  Patient now complains of shortness of breath and mild chest tightness  Patient denies any nausea or vomiting      History provided by:  Patient   used: No    Allergic Reaction  Presenting symptoms: itching    Presenting symptoms: no difficulty breathing, no rash and no wheezing    Severity:  Mild  Prior allergic episodes:  Food/nut allergies  Context: food    Ineffective treatments:  None tried      Prior to Admission Medications   Prescriptions Last Dose Informant Patient Reported? Taking?   dicyclomine (BENTYL) 20 mg tablet   No No   Sig: Take 1 tablet (20 mg total) by mouth 2 (two) times a day for 3 days   diphenhydrAMINE (BENADRYL) 25 mg tablet   No No   Sig: Take 1 tablet (25 mg total) by mouth every 6 (six) hours   diphenhydrAMINE (BENADRYL) 25 mg tablet   No Yes   Sig: Take 1 tablet (25 mg total) by mouth every 6 (six) hours as needed for itching      Facility-Administered Medications: None       History reviewed  No pertinent past medical history  Past Surgical History:   Procedure Laterality Date    EYE SURGERY         History reviewed  No pertinent family history  I have reviewed and agree with the history as documented  E-Cigarette/Vaping     E-Cigarette/Vaping Substances     Social History     Tobacco Use    Smoking status: Never Smoker    Smokeless tobacco: Never Used   Substance Use Topics    Alcohol use: Yes     Comment: rare    Drug use: No       Review of Systems   Constitutional: Negative for chills and fever     HENT: Negative for ear pain and sore throat  Eyes: Negative for pain and visual disturbance  Respiratory: Positive for chest tightness and shortness of breath  Negative for cough and wheezing  Cardiovascular: Negative for chest pain and palpitations  Gastrointestinal: Negative for abdominal pain and vomiting  Genitourinary: Negative for dysuria and hematuria  Musculoskeletal: Negative for arthralgias and back pain  Skin: Positive for itching  Negative for color change and rash  Neurological: Negative for seizures and syncope  All other systems reviewed and are negative  Physical Exam  Physical Exam  Vitals and nursing note reviewed  Constitutional:       Appearance: He is well-developed  He is not toxic-appearing  HENT:      Head: Normocephalic and atraumatic  Right Ear: Tympanic membrane normal       Left Ear: Tympanic membrane normal       Nose: Nose normal    Eyes:      General: Lids are normal       Conjunctiva/sclera: Conjunctivae normal       Pupils: Pupils are equal, round, and reactive to light  Neck:      Vascular: No carotid bruit or JVD  Trachea: Trachea normal    Cardiovascular:      Rate and Rhythm: Normal rate and regular rhythm  No extrasystoles are present  Heart sounds: Normal heart sounds  Pulmonary:      Effort: Pulmonary effort is normal       Breath sounds: No decreased breath sounds, wheezing, rhonchi or rales  Chest:      Chest wall: No deformity or tenderness  Abdominal:      General: Bowel sounds are normal       Palpations: Abdomen is soft  Tenderness: There is no abdominal tenderness  There is no guarding or rebound  Musculoskeletal:      Right shoulder: No swelling, deformity or tenderness  Normal range of motion  Cervical back: Normal range of motion and neck supple  No deformity, tenderness or bony tenderness  Lymphadenopathy:      Cervical: No cervical adenopathy  Skin:     General: Skin is warm and dry     Neurological: Mental Status: He is alert and oriented to person, place, and time  Cranial Nerves: No cranial nerve deficit  Sensory: No sensory deficit  Deep Tendon Reflexes: Reflexes are normal and symmetric  Psychiatric:         Speech: Speech normal          Behavior: Behavior normal          Thought Content: Thought content normal          Judgment: Judgment normal          Vital Signs  ED Triage Vitals [09/22/22 1500]   Temperature Pulse Respirations Blood Pressure SpO2   98 1 °F (36 7 °C) 90 18 154/94 100 %      Temp Source Heart Rate Source Patient Position - Orthostatic VS BP Location FiO2 (%)   Oral Monitor Lying Left arm --      Pain Score       1           Vitals:    09/22/22 1500 09/22/22 1528 09/22/22 1630   BP: 154/94 154/94 137/93   Pulse: 90 93 98   Patient Position - Orthostatic VS: Lying Lying Sitting         Visual Acuity      ED Medications  Medications   predniSONE tablet 60 mg (60 mg Oral Given 9/22/22 1505)   famotidine (PEPCID) tablet 20 mg (20 mg Oral Given 9/22/22 1505)   diphenhydrAMINE (BENADRYL) tablet 25 mg (25 mg Oral Given 9/22/22 1505)       Diagnostic Studies  Results Reviewed     None                 No orders to display              Procedures  Procedures         ED Course                                             MDM  Number of Diagnoses or Management Options  Allergic reaction, initial encounter: new and does not require workup  Patient Progress  Patient progress: improved      Disposition  Final diagnoses: Allergic reaction, initial encounter     Time reflects when diagnosis was documented in both MDM as applicable and the Disposition within this note     Time User Action Codes Description Comment    9/22/2022  4:50 PM Sotero Clay K Add [T78 40XA] Allergic reaction, initial encounter     9/22/2022  4:50 PM Ostero Clay K Presley Leon  1XXA] Allergic reaction to food, initial encounter       ED Disposition     ED Disposition   Discharge    Condition   Stable Date/Time   Thu Sep 22, 2022  4:50 PM    Comment   Adryan Rojas discharge to home/self care  Follow-up Information     Follow up With Specialties Details Why Contact Info Additional Information    [address] [facility] Medicine Schedule an appointment as soon as possible for a visit   [address] [phone]  [address] , [place], [place], [address]          Discharge Medication List as of 9/22/2022  4:51 PM      START taking these medications    Details   predniSONE 20 mg tablet Take 3 tabs daily for 4 days, Print         CONTINUE these medications which have CHANGED    Details   diphenhydrAMINE (BENADRYL) 25 mg tablet Take 1 tablet (25 mg total) by mouth every 6 (six) hours as needed for itching, Starting Thu 9/22/2022, Print         CONTINUE these medications which have NOT CHANGED    Details   dicyclomine (BENTYL) 20 mg tablet Take 1 tablet (20 mg total) by mouth 2 (two) times a day for 3 days, Starting Thu 8/8/2019, Until Sun 8/11/2019, Print             No discharge procedures on file      PDMP Review     None          ED Provider  Electronically Signed by           Yara Sanchez DO  09/22/22 4929

## 2023-12-11 ENCOUNTER — HOSPITAL ENCOUNTER (EMERGENCY)
Facility: HOSPITAL | Age: 23
Discharge: HOME | End: 2023-12-11
Attending: INTERNAL MEDICINE

## 2023-12-11 ENCOUNTER — APPOINTMENT (EMERGENCY)
Dept: CT IMAGING | Facility: HOSPITAL | Age: 23
End: 2023-12-11

## 2023-12-11 VITALS
RESPIRATION RATE: 20 BRPM | OXYGEN SATURATION: 98 % | HEART RATE: 103 BPM | BODY MASS INDEX: 50.58 KG/M2 | TEMPERATURE: 100 F | DIASTOLIC BLOOD PRESSURE: 80 MMHG | SYSTOLIC BLOOD PRESSURE: 130 MMHG | WEIGHT: 315 LBS

## 2023-12-11 DIAGNOSIS — J02.0 STREP PHARYNGITIS: ICD-10-CM

## 2023-12-11 DIAGNOSIS — R06.00 DYSPNEA: ICD-10-CM

## 2023-12-11 DIAGNOSIS — R09.81 CONGESTION OF NASAL SINUS: ICD-10-CM

## 2023-12-11 DIAGNOSIS — R65.10 SIRS (SYSTEMIC INFLAMMATORY RESPONSE SYNDROME) (HCC): Primary | ICD-10-CM

## 2023-12-11 LAB
ALBUMIN SERPL BCP-MCNC: 4 G/DL (ref 3.5–5)
ALP SERPL-CCNC: 89 U/L (ref 34–104)
ALT SERPL W P-5'-P-CCNC: 4 U/L (ref 7–52)
ANION GAP SERPL CALCULATED.3IONS-SCNC: 12 MMOL/L
APTT PPP: 42 SECONDS (ref 23–37)
AST SERPL W P-5'-P-CCNC: 12 U/L (ref 13–39)
ATRIAL RATE: 130 BPM
BASOPHILS # BLD AUTO: 0.06 THOUSANDS/ÂΜL (ref 0–0.1)
BASOPHILS NFR BLD AUTO: 0 % (ref 0–1)
BILIRUB SERPL-MCNC: 0.62 MG/DL (ref 0.2–1)
BUN SERPL-MCNC: 8 MG/DL (ref 5–25)
CALCIUM SERPL-MCNC: 8.8 MG/DL (ref 8.4–10.2)
CHLORIDE SERPL-SCNC: 100 MMOL/L (ref 96–108)
CO2 SERPL-SCNC: 26 MMOL/L (ref 21–32)
CREAT SERPL-MCNC: 0.91 MG/DL (ref 0.6–1.3)
EOSINOPHIL # BLD AUTO: 0 THOUSAND/ÂΜL (ref 0–0.61)
EOSINOPHIL NFR BLD AUTO: 0 % (ref 0–6)
ERYTHROCYTE [DISTWIDTH] IN BLOOD BY AUTOMATED COUNT: 14.6 % (ref 11.6–15.1)
FLUAV RNA RESP QL NAA+PROBE: NEGATIVE
FLUBV RNA RESP QL NAA+PROBE: NEGATIVE
GFR SERPL CREATININE-BSD FRML MDRD: 116 ML/MIN/1.73SQ M
GLUCOSE SERPL-MCNC: 125 MG/DL (ref 65–140)
HCT VFR BLD AUTO: 47 % (ref 36.5–49.3)
HGB BLD-MCNC: 15.5 G/DL (ref 12–17)
IMM GRANULOCYTES # BLD AUTO: 0.14 THOUSAND/UL (ref 0–0.2)
IMM GRANULOCYTES NFR BLD AUTO: 1 % (ref 0–2)
INR PPP: 1.08 (ref 0.84–1.19)
LACTATE SERPL-SCNC: 0.6 MMOL/L (ref 0.5–2)
LYMPHOCYTES # BLD AUTO: 1.35 THOUSANDS/ÂΜL (ref 0.6–4.47)
LYMPHOCYTES NFR BLD AUTO: 6 % (ref 14–44)
MCH RBC QN AUTO: 26.4 PG (ref 26.8–34.3)
MCHC RBC AUTO-ENTMCNC: 33 G/DL (ref 31.4–37.4)
MCV RBC AUTO: 80 FL (ref 82–98)
MONOCYTES # BLD AUTO: 1.53 THOUSAND/ÂΜL (ref 0.17–1.22)
MONOCYTES NFR BLD AUTO: 7 % (ref 4–12)
NEUTROPHILS # BLD AUTO: 19.68 THOUSANDS/ÂΜL (ref 1.85–7.62)
NEUTS SEG NFR BLD AUTO: 86 % (ref 43–75)
NRBC BLD AUTO-RTO: 0 /100 WBCS
P AXIS: 66 DEGREES
PLATELET # BLD AUTO: 228 THOUSANDS/UL (ref 149–390)
PMV BLD AUTO: 8.6 FL (ref 8.9–12.7)
POTASSIUM SERPL-SCNC: 3.3 MMOL/L (ref 3.5–5.3)
PR INTERVAL: 136 MS
PROCALCITONIN SERPL-MCNC: 0.53 NG/ML
PROT SERPL-MCNC: 7.7 G/DL (ref 6.4–8.4)
PROTHROMBIN TIME: 14.3 SECONDS (ref 11.6–14.5)
QRS AXIS: 34 DEGREES
QRSD INTERVAL: 94 MS
QT INTERVAL: 296 MS
QTC INTERVAL: 435 MS
RBC # BLD AUTO: 5.87 MILLION/UL (ref 3.88–5.62)
RSV RNA RESP QL NAA+PROBE: NEGATIVE
S PYO DNA THROAT QL NAA+PROBE: DETECTED
SARS-COV-2 RNA RESP QL NAA+PROBE: NEGATIVE
SODIUM SERPL-SCNC: 138 MMOL/L (ref 135–147)
T WAVE AXIS: 34 DEGREES
VENTRICULAR RATE: 130 BPM
WBC # BLD AUTO: 22.76 THOUSAND/UL (ref 4.31–10.16)

## 2023-12-11 PROCEDURE — 85025 COMPLETE CBC W/AUTO DIFF WBC: CPT | Performed by: INTERNAL MEDICINE

## 2023-12-11 PROCEDURE — 71260 CT THORAX DX C+: CPT

## 2023-12-11 PROCEDURE — 84145 PROCALCITONIN (PCT): CPT | Performed by: INTERNAL MEDICINE

## 2023-12-11 PROCEDURE — 85610 PROTHROMBIN TIME: CPT | Performed by: INTERNAL MEDICINE

## 2023-12-11 PROCEDURE — 96375 TX/PRO/DX INJ NEW DRUG ADDON: CPT

## 2023-12-11 PROCEDURE — 99283 EMERGENCY DEPT VISIT LOW MDM: CPT

## 2023-12-11 PROCEDURE — 0241U HB NFCT DS VIR RESP RNA 4 TRGT: CPT | Performed by: INTERNAL MEDICINE

## 2023-12-11 PROCEDURE — 70491 CT SOFT TISSUE NECK W/DYE: CPT

## 2023-12-11 PROCEDURE — 80053 COMPREHEN METABOLIC PANEL: CPT | Performed by: INTERNAL MEDICINE

## 2023-12-11 PROCEDURE — 93005 ELECTROCARDIOGRAM TRACING: CPT

## 2023-12-11 PROCEDURE — G1004 CDSM NDSC: HCPCS

## 2023-12-11 PROCEDURE — 83605 ASSAY OF LACTIC ACID: CPT | Performed by: INTERNAL MEDICINE

## 2023-12-11 PROCEDURE — 36415 COLL VENOUS BLD VENIPUNCTURE: CPT | Performed by: INTERNAL MEDICINE

## 2023-12-11 PROCEDURE — 87040 BLOOD CULTURE FOR BACTERIA: CPT | Performed by: INTERNAL MEDICINE

## 2023-12-11 PROCEDURE — 85730 THROMBOPLASTIN TIME PARTIAL: CPT | Performed by: INTERNAL MEDICINE

## 2023-12-11 PROCEDURE — 99285 EMERGENCY DEPT VISIT HI MDM: CPT | Performed by: INTERNAL MEDICINE

## 2023-12-11 PROCEDURE — 96365 THER/PROPH/DIAG IV INF INIT: CPT

## 2023-12-11 PROCEDURE — 87651 STREP A DNA AMP PROBE: CPT | Performed by: INTERNAL MEDICINE

## 2023-12-11 PROCEDURE — 96361 HYDRATE IV INFUSION ADD-ON: CPT

## 2023-12-11 RX ORDER — DEXAMETHASONE SODIUM PHOSPHATE 10 MG/ML
10 INJECTION, SOLUTION INTRAMUSCULAR; INTRAVENOUS ONCE
Status: COMPLETED | OUTPATIENT
Start: 2023-12-11 | End: 2023-12-11

## 2023-12-11 RX ORDER — FLUTICASONE PROPIONATE 50 MCG
1 SPRAY, SUSPENSION (ML) NASAL DAILY
Qty: 16 G | Refills: 0 | Status: SHIPPED | OUTPATIENT
Start: 2023-12-11

## 2023-12-11 RX ORDER — AMOXICILLIN 500 MG/1
1000 CAPSULE ORAL ONCE
Status: COMPLETED | OUTPATIENT
Start: 2023-12-11 | End: 2023-12-11

## 2023-12-11 RX ORDER — KETOROLAC TROMETHAMINE 30 MG/ML
15 INJECTION, SOLUTION INTRAMUSCULAR; INTRAVENOUS ONCE
Status: COMPLETED | OUTPATIENT
Start: 2023-12-11 | End: 2023-12-11

## 2023-12-11 RX ORDER — OXYMETAZOLINE HYDROCHLORIDE 0.05 G/100ML
2 SPRAY NASAL ONCE
Status: COMPLETED | OUTPATIENT
Start: 2023-12-11 | End: 2023-12-11

## 2023-12-11 RX ORDER — PREDNISONE 20 MG/1
40 TABLET ORAL DAILY
Qty: 8 TABLET | Refills: 0 | Status: SHIPPED | OUTPATIENT
Start: 2023-12-11 | End: 2023-12-15

## 2023-12-11 RX ORDER — ACETAMINOPHEN 10 MG/ML
1000 INJECTION, SOLUTION INTRAVENOUS ONCE
Status: COMPLETED | OUTPATIENT
Start: 2023-12-11 | End: 2023-12-11

## 2023-12-11 RX ORDER — AMOXICILLIN 500 MG/1
500 CAPSULE ORAL EVERY 12 HOURS SCHEDULED
Qty: 20 CAPSULE | Refills: 0 | Status: SHIPPED | OUTPATIENT
Start: 2023-12-11 | End: 2023-12-21

## 2023-12-11 RX ADMIN — AMOXICILLIN 1000 MG: 500 CAPSULE ORAL at 13:55

## 2023-12-11 RX ADMIN — DEXAMETHASONE SODIUM PHOSPHATE 10 MG: 10 INJECTION, SOLUTION INTRAMUSCULAR; INTRAVENOUS at 12:02

## 2023-12-11 RX ADMIN — ACETAMINOPHEN 1000 MG: 10 INJECTION INTRAVENOUS at 12:54

## 2023-12-11 RX ADMIN — IOHEXOL 100 ML: 350 INJECTION, SOLUTION INTRAVENOUS at 12:50

## 2023-12-11 RX ADMIN — SODIUM CHLORIDE 1000 ML: 0.9 INJECTION, SOLUTION INTRAVENOUS at 12:01

## 2023-12-11 RX ADMIN — OXYMETAZOLINE HCL 2 SPRAY: 0.05 SPRAY NASAL at 14:27

## 2023-12-11 RX ADMIN — KETOROLAC TROMETHAMINE 15 MG: 30 INJECTION, SOLUTION INTRAMUSCULAR; INTRAVENOUS at 12:04

## 2023-12-11 NOTE — ED PROVIDER NOTES
History  Chief Complaint   Patient presents with    Sore Throat     Sore throat for 2 days with difficult time breathing and swallowing      HPI  78-year-old male history of morbid obesity presents to ED for evaluation of sore throat, dyspnea and fevers. Patient reports symptoms for 2 days. He states he has not taken any medications or therapies for his symptoms. He reports sore throat that gets worse when he swallows. He states he is able to tolerate his secretions but is having difficulty swallowing anything else. He reports he has been around someone that had a cough, sore throat and fevers. He is unsure what they were diagnosed with. Patient denies headache, visual changes, dizziness, fevers, chills, chest pain, palpitations, abdominal pain, diarrhea, melena, hematochezia, dysuria, new skin rashes or numbness or tingling of the extremities. Allergies include seafood allergy. He states he has not had seafood or been around any seafood recently. Denies previous anaphylactic reactions. Prior to Admission Medications   Prescriptions Last Dose Informant Patient Reported? Taking?   dicyclomine (BENTYL) 20 mg tablet   No No   Sig: Take 1 tablet (20 mg total) by mouth 2 (two) times a day for 3 days   diphenhydrAMINE (BENADRYL) 25 mg tablet   No No   Sig: Take 1 tablet (25 mg total) by mouth every 6 (six) hours as needed for itching   predniSONE 20 mg tablet   No No   Sig: Take 3 tabs daily for 4 days      Facility-Administered Medications: None       History reviewed. No pertinent past medical history. Past Surgical History:   Procedure Laterality Date    EYE SURGERY         History reviewed. No pertinent family history. I have reviewed and agree with the history as documented.     E-Cigarette/Vaping    E-Cigarette Use Never User      E-Cigarette/Vaping Substances     Social History     Tobacco Use    Smoking status: Never    Smokeless tobacco: Never   Vaping Use    Vaping Use: Never used   Substance Use Topics    Alcohol use: Yes     Comment: rare    Drug use: No       Review of Systems   All other systems reviewed and are negative. Physical Exam  Physical Exam  Constitutional:       General: He is in acute distress. Appearance: He is obese. He is ill-appearing. HENT:      Right Ear: Ear canal normal.      Left Ear: Ear canal normal.      Mouth/Throat:      Lips: Pink. Tongue: No lesions. Tongue does not deviate from midline. Pharynx: Uvula midline. Posterior oropharyngeal erythema present. Tonsils: Tonsillar exudate present. No tonsillar abscesses. Eyes:      Conjunctiva/sclera: Conjunctivae normal.   Cardiovascular:      Rate and Rhythm: Tachycardia present. Heart sounds: Normal heart sounds. Pulmonary:      Effort: Pulmonary effort is normal. No respiratory distress. Breath sounds: No wheezing. Abdominal:      General: There is no distension. Palpations: Abdomen is soft. Tenderness: There is no abdominal tenderness. There is no guarding or rebound. Skin:     General: Skin is warm. Capillary Refill: Capillary refill takes less than 2 seconds. Neurological:      Mental Status: He is alert.          Vital Signs  ED Triage Vitals   Temperature Pulse Respirations Blood Pressure SpO2   12/11/23 1133 12/11/23 1133 12/11/23 1133 12/11/23 1133 12/11/23 1133   (!) 102.9 °F (39.4 °C) (!) 140 21 (!) 77/66 92 %      Temp Source Heart Rate Source Patient Position - Orthostatic VS BP Location FiO2 (%)   12/11/23 1133 12/11/23 1133 12/11/23 1133 12/11/23 1133 --   Oral Monitor Sitting Left arm       Pain Score       12/11/23 1204       8           Vitals:    12/11/23 1216 12/11/23 1254 12/11/23 1325 12/11/23 1431   BP: 139/78 128/72 126/80 130/80   Pulse: (!) 125 (!) 115 102 103   Patient Position - Orthostatic VS: Lying Lying Lying Lying         Visual Acuity      ED Medications  Medications   ketorolac (TORADOL) injection 15 mg (15 mg Intravenous Given 12/11/23 1204)   dexamethasone (PF) (DECADRON) injection 10 mg (10 mg Intravenous Given 12/11/23 1202)   sodium chloride 0.9 % bolus 1,000 mL (0 mL Intravenous Stopped 12/11/23 1356)   acetaminophen (Ofirmev) injection 1,000 mg (0 mg Intravenous Stopped 12/11/23 1325)   iohexol (OMNIPAQUE) 350 MG/ML injection (MULTI-DOSE) 100 mL (100 mL Intravenous Given 12/11/23 1250)   amoxicillin (AMOXIL) capsule 1,000 mg (1,000 mg Oral Given 12/11/23 1355)   oxymetazoline (AFRIN) 0.05 % nasal spray 2 spray (2 sprays Each Nare Given 12/11/23 1427)       Diagnostic Studies  Results Reviewed       Procedure Component Value Units Date/Time    COVID/FLU/RSV [[id number]]  (Normal) Collected: 12/11/23 1208    Lab Status: Final result Specimen: Nares from Nose Updated: 12/11/23 1257     SARS-CoV-2 Negative     INFLUENZA A PCR Negative     INFLUENZA B PCR Negative     RSV PCR Negative    Narrative:      FOR PEDIATRIC PATIENTS - copy/paste COVID Guidelines URL to browser: [email or web address]    SARS-CoV-2 assay is a Nucleic Acid Amplification assay intended for the  qualitative detection of nucleic acid from SARS-CoV-2 in nasopharyngeal  swabs. Results are for the presumptive identification of SARS-CoV-2 RNA. Positive results are indicative of infection with SARS-CoV-2, the virus  causing COVID-19, but do not rule out bacterial infection or co-infection  with other viruses. Laboratories within the [facility] and its  territories are required to report all positive results to the appropriate  public health authorities. Negative results do not preclude SARS-CoV-2  infection and should not be used as the sole basis for treatment or other  patient management decisions. Negative results must be combined with  clinical observations, patient history, and epidemiological information. This test has not been FDA cleared or approved.     This test has been authorized by FDA under an Emergency Use Authorization  (EUA). This test is only authorized for the duration of time the  declaration that circumstances exist justifying the authorization of the  emergency use of an in vitro diagnostic tests for detection of SARS-CoV-2  virus and/or diagnosis of COVID-19 infection under section 564(b)(1) of  the Act, 21 U. S.C. 012ETD-4(F)(0), unless the authorization is terminated  or revoked sooner. The test has been validated but independent review by FDA  and CLIA is pending. Test performed using [employer] GeneXpert: This RT-PCR assay targets N2,  a region unique to SARS-CoV-2. A conserved region in the E-gene was chosen  for pan-Sarbecovirus detection which includes SARS-CoV-2. According to CMS-2020-01-R, this platform meets the definition of high-throughput technology. Lactic acid [[id number]]  (Normal) Collected: 12/11/23 1208    Lab Status: Final result Specimen: Blood from Hand, Right Updated: 12/11/23 1248     LACTIC ACID 0.6 mmol/L     Narrative:      Result may be elevated if tourniquet was used during collection.     Strep A PCR [[id number]]  (Abnormal) Collected: 12/11/23 1208    Lab Status: Final result Specimen: Throat Updated: 12/11/23 1241     STREP A PCR Detected    Procalcitonin [[id number]]  (Abnormal) Collected: 12/11/23 1153    Lab Status: Final result Specimen: Blood from Arm, Left Updated: 12/11/23 1233     Procalcitonin 0.53 ng/ml     Protime-INR [[id number]]  (Normal) Collected: 12/11/23 1153    Lab Status: Final result Specimen: Blood from Arm, Left Updated: 12/11/23 1229     Protime 14.3 seconds      INR 1.08    APTT [[id number]]  (Abnormal) Collected: 12/11/23 1153    Lab Status: Final result Specimen: Blood from Arm, Left Updated: 12/11/23 1229     PTT 42 seconds     Comprehensive metabolic panel [[id number]]  (Abnormal) Collected: 12/11/23 1153    Lab Status: Final result Specimen: Blood from Arm, Left Updated: 12/11/23 1219     Sodium 138 mmol/L      Potassium 3.3 mmol/L      Chloride 100 mmol/L      CO2 26 mmol/L      ANION GAP 12 mmol/L      BUN 8 mg/dL      Creatinine 0.91 mg/dL      Glucose 125 mg/dL      Calcium 8.8 mg/dL      AST 12 U/L      ALT 4 U/L      Alkaline Phosphatase 89 U/L      Total Protein 7.7 g/dL      Albumin 4.0 g/dL      Total Bilirubin 0.62 mg/dL      eGFR 116 ml/min/1.73sq m     Narrative:      [facility] (CKD):     Stage 1 with normal or high GFR (GFR > 90 mL/min/1.73 square meters)    Stage 2 Mild CKD (GFR = 60-89 mL/min/1.73 square meters)    Stage 3A Moderate CKD (GFR = 45-59 mL/min/1.73 square meters)    Stage 3B Moderate CKD (GFR = 30-44 mL/min/1.73 square meters)    Stage 4 Severe CKD (GFR = 15-29 mL/min/1.73 square meters)    Stage 5 End Stage CKD (GFR <15 mL/min/1.73 square meters)  Note: GFR calculation is accurate only with a steady state creatinine    Blood culture #1 [[id number]] Collected: 12/11/23 1205    Lab Status: In process Specimen: Blood from Hand, Right Updated: 12/11/23 1213    CBC and differential [[id number]]  (Abnormal) Collected: 12/11/23 1153    Lab Status: Final result Specimen: Blood from Arm, Left Updated: 12/11/23 1201     WBC 22.76 Thousand/uL      RBC 5.87 Million/uL      Hemoglobin 15.5 g/dL      Hematocrit 47.0 %      MCV 80 fL      MCH 26.4 pg      MCHC 33.0 g/dL      RDW 14.6 %      MPV 8.6 fL      Platelets 768 Thousands/uL      nRBC 0 /100 WBCs      Neutrophils Relative 86 %      Immat GRANS % 1 %      Lymphocytes Relative 6 %      Monocytes Relative 7 %      Eosinophils Relative 0 %      Basophils Relative 0 %      Neutrophils Absolute 19.68 Thousands/µL      Immature Grans Absolute 0.14 Thousand/uL      Lymphocytes Absolute 1.35 Thousands/µL      Monocytes Absolute 1.53 Thousand/µL      Eosinophils Absolute 0.00 Thousand/µL      Basophils Absolute 0.06 Thousands/µL     Blood culture #2 [[id number]] Collected: 12/11/23 1153    Lab Status:  In process Specimen: Blood from Arm, Left Updated: 12/11/23 1157    UA w Reflex to Microscopic w Reflex to Culture [[id number]]     Lab Status: No result Specimen: Urine                    CT soft tissue neck with contrast   Final Result by Pao Perez MD (12/11 1322)      Pharyngitis with enlargement of the adenoids, tonsils and uvula. Mass effect onto the nasopharynx and oropharynx. Nonorganized focus of fluid in the uvula, currently nondrainable. No abscess. Symmetric submandibular lymphadenopathy, likely reactive. Recommend clinical follow-up. Mild mucosal thickening in the paranasal sinuses. Incidental findings, as per the body of the report. Workstation performed: [id number]         CT chest w contrast   Final Result by Pao Perez MD (12/11 1314)      No acute intrathoracic pathology. Incidental findings, as per the body of the report. Workstation performed: [id number]                    Procedures  Procedures         ED Course  ED Course as of 12/11/23 1439   Mon Dec 11, 2023   1202 WBC(!): 22.76   1250 STREP A PCR(!): Detected   1250 Procalcitonin(!): 0.53   1256 LACTIC ACID: 0.6   1311 Patient reexamined, he reports significant improvement of throat pain pain and difficulty swallowing   1341 I discussed pt with ENT attending, Dr. Anna Jackson, recommends  antibiotics plus/minus steroids, no other intervention necessary   1436 Discussed inpatient observation versus outpatient treatment with patient. Given severity of symptoms and initial presentation, I encouraged patient to be admitted for observation. He declines at this time, stating he feels much better and would like to be home. Strict return precautions were discussed and patient voiced understanding.   Patient stated he will return for any worsening of symptoms, any difficulty breathing or tolerating secretions or sensation of throat closing                               SBIRT 20yo+      Flowsheet Row Most Recent Value   Initial Alcohol Screen: US AUDIT-C     1. How often do you have a drink containing alcohol? 0 Filed at: 12/11/2023 1233   2. How many drinks containing alcohol do you have on a typical day you are drinking? 0 Filed at: 12/11/2023 1233   3a. Male UNDER 65: How often do you have five or more drinks on one occasion? 0 Filed at: 12/11/2023 1233   3b. FEMALE Any Age, or MALE 65+: How often do you have 4 or more drinks on one occassion? 0 Filed at: 12/11/2023 1233   Audit-C Score 0 Filed at: 12/11/2023 1233   ESDRAS: How many times in the past year have you. .. Used an illegal drug or used a prescription medication for non-medical reasons? Never Filed at: 12/11/2023 1233                      Medical Decision Making  17-year-old male history of morbid obesity presents to ED for evaluation of sore throat, dyspnea and fevers. He is ill-appearing. He is tachycardic, hypotensive, febrile. He is sitting up and spitting into a basin. He has a hard time tolerating his secretions, although he denies this. His posterior oropharynx evaluation is limited secondary to obese body habitus and large neck. His lungs are clear to auscultation. Abdomen exam is benign. He is alert and oriented. Differential diagnosis include strep pharyngitis, COVID, influenza, tonsillitis, uvulitis, other oropharyngeal pathology. Given SIRS, patient will need blood work stat. Given limited visualization of posterior oropharynx, will perform CT neck. Will discuss patient with ENT    Problems Addressed:  Congestion of nasal sinus: acute illness or injury  Dyspnea: acute illness or injury  SIRS (systemic inflammatory response syndrome) ([address]): acute illness or injury  Strep pharyngitis: acute illness or injury    Amount and/or Complexity of Data Reviewed  Labs: ordered. Decision-making details documented in ED Course. Radiology: ordered. Risk  OTC drugs. Prescription drug management.              Disposition  Final diagnoses:   SIRS (systemic inflammatory response syndrome) ([address])   Dyspnea   Strep pharyngitis   Congestion of nasal sinus     Time reflects when diagnosis was documented in both MDM as applicable and the Disposition within this note       Time User Action Codes Description Comment    12/11/2023  2:16 PM Jenna Marshal Add [J02.0] Strep pharyngitis     12/11/2023  2:17 PM Jenna Marshal Add [R06.00] Dyspnea     12/11/2023  2:17 PM Jenna Marshal Add [R65.10] SIRS (systemic inflammatory response syndrome) ([address])     12/11/2023  2:17 PM Veena Lively [J02.0] Strep pharyngitis     12/11/2023  2:17 PM Jenna Marshal Modify [R65.10] SIRS (systemic inflammatory response syndrome) ([address])     12/11/2023  2:18 PM Jenna Marshal Add [R09.81] Congestion of nasal sinus     12/11/2023  2:39 PM Jenna Marshal Modify [R65.10] SIRS (systemic inflammatory response syndrome) ([address])     12/11/2023  2:39 PM Jenna Marshal Modify [R06.00] Dyspnea     12/11/2023  2:39 PM Jenna Marshal Modify [R06.00] Dyspnea     12/11/2023  2:39 PM Jenna Marshal Modify [R65.10] SIRS (systemic inflammatory response syndrome) ([address])     12/11/2023  2:39 PM Jenna Marshal Modify [R65.10] SIRS (systemic inflammatory response syndrome) ([address])     12/11/2023  2:39 PM Jenna Marshal Modify [R06.00] Dyspnea     12/11/2023  2:39 PM Jenna Marshal Modify [R65.10] SIRS (systemic inflammatory response syndrome) ([address])     12/11/2023  2:39 PM Jenna Marshal Modify [J02.0] Strep pharyngitis           ED Disposition       ED Disposition   Discharge    Condition   Stable    Date/Time   Mon Dec 11, 2023 1416    Comment   Adryan Rojas discharge to home/self care.                    Follow-up Information    None         Discharge Medication List as of 12/11/2023  2:18 PM        START taking these medications    Details   amoxicillin (AMOXIL) 500 mg capsule Take 1 capsule (500 mg total) by mouth every 12 (twelve) hours for 10 days, Starting Mon 12/11/2023, Until Thu 12/21/2023, Print      fluticasone [facility]) 50 mcg/act nasal spray 1 spray into each nostril daily, Starting Mon 12/11/2023, Print      !! predniSONE 20 mg tablet Take 2 tablets (40 mg total) by mouth daily for 4 days, Starting Mon 12/11/2023, Until Fri 12/15/2023, Print       !! - Potential duplicate medications found. Please discuss with provider. CONTINUE these medications which have NOT CHANGED    Details   dicyclomine (BENTYL) 20 mg tablet Take 1 tablet (20 mg total) by mouth 2 (two) times a day for 3 days, Starting Thu 8/8/2019, Until Sun 8/11/2019, Print      diphenhydrAMINE (BENADRYL) 25 mg tablet Take 1 tablet (25 mg total) by mouth every 6 (six) hours as needed for itching, Starting Thu 9/22/2022, Print      !! predniSONE 20 mg tablet Take 3 tabs daily for 4 days, Print       !! - Potential duplicate medications found. Please discuss with provider. No discharge procedures on file.     PDMP Review       None            ED Provider  Electronically Signed by             Pan Watts MD  12/11/23 7182

## 2023-12-16 LAB
BACTERIA BLD CULT: NORMAL
BACTERIA BLD CULT: NORMAL